# Patient Record
Sex: FEMALE | Race: WHITE
[De-identification: names, ages, dates, MRNs, and addresses within clinical notes are randomized per-mention and may not be internally consistent; named-entity substitution may affect disease eponyms.]

---

## 2018-05-25 ENCOUNTER — HOSPITAL ENCOUNTER (OUTPATIENT)
Dept: HOSPITAL 63 - US | Age: 33
Discharge: HOME | End: 2018-05-25
Payer: OTHER GOVERNMENT

## 2018-05-25 VITALS — WEIGHT: 125 LBS | BODY MASS INDEX: 21.34 KG/M2 | HEIGHT: 64 IN

## 2018-05-25 DIAGNOSIS — R11.0: ICD-10-CM

## 2018-05-25 DIAGNOSIS — R10.84: Primary | ICD-10-CM

## 2018-05-25 PROCEDURE — 78226 HEPATOBILIARY SYSTEM IMAGING: CPT

## 2018-05-25 PROCEDURE — A9537 TC99M MEBROFENIN: HCPCS

## 2018-05-25 PROCEDURE — 96375 TX/PRO/DX INJ NEW DRUG ADDON: CPT

## 2018-05-25 PROCEDURE — 76700 US EXAM ABDOM COMPLETE: CPT

## 2018-05-25 PROCEDURE — 96374 THER/PROPH/DIAG INJ IV PUSH: CPT

## 2018-05-25 NOTE — RAD
EXAM: Nuclear hepatobiliary scan.

 

HISTORY: Pain and nausea.

 

TECHNIQUE: Following intravenous administration of 5.5 mCi Tc 99m 

Choletec, anterior images of the abdomen were obtained at five minute 

intervals through one hour. Subsequently, 1.14 mcg sincalide was 

administered and additional images to assess gallbladder ejection fraction

were obtained.

 

FINDINGS: There is prompt radiotracer uptake by the liver. No focal defect

is seen. There is normal excretion into the biliary tree. The gallbladder 

is visualized within 15 minutes and there is free flow into the duodenum.

The gallbladder ejection fraction is 17%.

 

IMPRESSION: Decreased gallbladder ejection fraction of 17%.

 

Electronically signed by: Melina Brennan MD (5/25/2018 1:05 PM) [id number]

## 2018-05-25 NOTE — RAD
Complete abdominal ultrasound 5/25/2018 8:18 AM

 

Clinical History: Intermittent abdominal pain x1 year

 

Technique:  Ultrasound examination of the abdomen was performed, and 

multiple static images were submitted for review.

 

Comparison:  None available 

 

Findings:

 

The visualized portions of the pancreas are within normal limits. The 

visualized aorta and IVC are unremarkable. 

 

The gallbladder is normal in appearance without wall thickening, stones, 

or sludge. No pericholecystic fluid is seen. Sonographic Edwards's sign is 

negative.  The common bile duct measures 4 mm in diameter which is within 

normal limits.

 

The liver measures 15.6 cm longitudinally. The liver is normal in 

echotexture. No intrahepatic biliary ductal dilatation is seen. No focal 

hepatic lesions are identified. Spleen is poorly visualized but appears 

grossly normal in size.

 

The bilateral kidneys are normal in appearance without evidence of 

obstructive uropathy, nephrolithiasis, or focal renal lesion. Right kidney

measures 10.6 cm in length. Left kidney measures 10.9 cm in length. 

 

 

Impression: Normal sonographic evidence of intra-abdominal abnormality is 

identified

 

Electronically signed by: Jigar Mcclain MD (5/25/2018 9:49 AM) [id number]